# Patient Record
(demographics unavailable — no encounter records)

---

## 2024-12-11 NOTE — HISTORY OF PRESENT ILLNESS
[de-identified] : Chief Complaint: Neck and left arm pain   History of Present Illness: 54-year-old male presenting today with complaints of neck and left arm pain.  He states that this started insidiously about 2 months ago he was lifting something was left arm felt a pull and has been dealing with left arm pain ever since.  He states that its mainly in the left scapula radiating from his neck also down the posterior aspect of his arm to his triceps not below his elbow.  He states that the symptoms are worse at nighttime and worse with driving.  He gets worsening numbness and tingling in his left hand but this is random and no specific distribution.  He states the pain is severe at times but can be intermittent.  He has not tried any anti-inflammatories.  He was sent to [facility] rule out a cardiac issue given the history of a bundle branch block and radiating left arm pain   Past medical history, past surgical history, medications, allergies, social history, and family history are as documented in our records today.  Notable items include: None   Review of Systems: I have reviewed the patient's documented Review of Systems data today, I concur with this documentation.

## 2024-12-11 NOTE — PHYSICAL EXAM
[de-identified] : CONSTITUTIONAL: Patient is a very pleasant individual who is well-nourished and appears stated age. PSYCHIATRIC: Alert and oriented times three and in no apparent distress, and participates with orthopedic evaluation well. HEAD: Atraumatic and nonsyndromic in appearance. EENT: No thyromegaly, EOMI. RESPIRATORY: Respiratory rate is regular, not dyspneic on examination. LYMPHATICS: There is no cervical or axillary lymphadenopathy. INTEGUMENTARY: Skin is clean, dry, and intact about the bilateral upper extremities and cervical spine. VASCULAR: There is brisk capillary refill about the bilateral upper extremities and radial pulses are 2/4.  Cervical Spine Exam:  Tenderness along the left-sided paraspinal cervical musculature left scapula and left triceps Shoulder Abduction (C5): 5/5 B/L Biceps (C6): 5/5 B/L Wrist Extension (C6): 5/5 B/L Triceps (C7): 5/5 RUE, 4/5 LUE Wrist Flexion (C7): 5/5 B/L Finger Adduction/Abduction (C8/T1): 5/5 B/L Sensation:  SILT C5-T1 bilateral  Reflexes: 2+ reflexes Bicep/Tricep/Quadriceps/Achilles  Gait: Normal gait w/o assistance Able to perform tandem gait Able to Heel Walk Able to Toe Walk  Special Testing:  Positive Spurlings recreating neck pain  negative clonus BL LE negative Hoffmans B/L UE   [de-identified] : CT scan cervical spine performed in [facility] PACS demonstrates mild spondylosis at C5-6 and C6-C7 mild foraminal stenosis left-sided C5-6 and C6-C7

## 2024-12-11 NOTE — ASSESSMENT
[FreeTextEntry1] : 54-year-old male with cervical Spondyloarthritis and left upper extremity C7 radiculopathy  Discussed with Lane believe his symptoms are related to compression of his left C7 nerve root.  Is causing some mild tricep weakness.  Is not affecting his quality of life therefore I believe we can continue to monitor I discussed that this should improve with time and conservative measures Medrol Dosepak Gabapentin 300 mg at nighttime Diclofenac twice daily as needed to follow once the Medrol Dosepak is completed Physical therapy I will see him back in 4 weeks if his symptoms did not improve we can consider more advanced imaging such as a cervical MRI

## 2025-01-08 NOTE — DISCUSSION/SUMMARY
[de-identified] : 54-year-old male with cervical Spondyloarthritis left upper extremity C7 versus C8 radiculopathy  Lane has had improvement with conservative treatment and we will continue his moving forward I would like for him to discontinue his gabapentin anti-inflammatory to see how he responds He will continue his physical therapy until completion I will see him back in 6 weeks if his symptoms have not improved we will likely move forward with more advanced imaging such as an MRI

## 2025-01-08 NOTE — HISTORY OF PRESENT ILLNESS
[de-identified] :  Chief Complaint: Neck and left arm pain   History of Present Illness: 54-year-old male presenting today for his 6-week follow-up visit.  Lane was diagnosed with cervical Spondyloarthritis left upper extremity C6 versus C7 radiculopathy.  You have tried gabapentin at nighttime and physical therapy states that his symptoms have significant improved he still feels some discomfort rating down his arm some numbness and tingling radiating down his arm but it is only an irritant 3 out of 10 in severity.  His neck pain has improved.  He would like to know if and when he can come off his medications.  The physical therapy is also helping he believes.   Past medical history, past surgical history, medications, allergies, social history, and family history are as documented in our records today.  Notable items include: None   Review of Systems: I have reviewed the patient's documented Review of Systems data today, I concur with this documentation.

## 2025-03-03 NOTE — DISCUSSION/SUMMARY
[de-identified] : 54-year-old male with cervical Spondyloarthritis left upper extremity radiculopathy  Lane continues to improve he is now not taking any medications and I discussed them that this is due to the natural history of a flareup He will continue to perform his home exercises he will take anti-inflammatories as needed If his symptoms return or if he has any other issues he will come back and see me

## 2025-03-03 NOTE — HISTORY OF PRESENT ILLNESS
[de-identified] : CC: Neck and left arm pain  hpi: 54-year-old male presenting today for his 6-week follow-up visit.  Lane was diagnosed with a cervical Spondyloarthritis with a left upper extremity C7 versus C8 radiculopathy.  His symptoms have been improving and during his last visit we had discontinued all medications he states that over the past 6 weeks his symptoms have continued to improve he is very happy with his current level of pain control very happy with his current progress.  Currently not taking any medications.  Is minimal to no discomfort.